# Patient Record
Sex: MALE | Race: WHITE | NOT HISPANIC OR LATINO | URBAN - METROPOLITAN AREA
[De-identification: names, ages, dates, MRNs, and addresses within clinical notes are randomized per-mention and may not be internally consistent; named-entity substitution may affect disease eponyms.]

---
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
## 2022-06-09 ENCOUNTER — EMERGENCY (EMERGENCY)
Facility: HOSPITAL | Age: 22
LOS: 1 days | Discharge: HOME | End: 2022-06-09
Attending: STUDENT IN AN ORGANIZED HEALTH CARE EDUCATION/TRAINING PROGRAM
Payer: COMMERCIAL

## 2022-06-09 VITALS
SYSTOLIC BLOOD PRESSURE: 112 MMHG | HEART RATE: 100 BPM | DIASTOLIC BLOOD PRESSURE: 64 MMHG | WEIGHT: 119.93 LBS | TEMPERATURE: 98 F | RESPIRATION RATE: 18 BRPM | OXYGEN SATURATION: 96 %

## 2022-06-09 PROCEDURE — 99283 EMERGENCY DEPT VISIT LOW MDM: CPT

## 2022-06-09 PROCEDURE — 82962 GLUCOSE BLOOD TEST: CPT

## 2022-06-09 PROCEDURE — 99284 EMERGENCY DEPT VISIT MOD MDM: CPT

## 2022-06-09 RX ORDER — LAMOTRIGINE 25 MG/1
100 TABLET, ORALLY DISINTEGRATING ORAL ONCE
Refills: 0 | Status: COMPLETED | OUTPATIENT
Start: 2022-06-09 | End: 2022-06-09

## 2022-06-09 RX ADMIN — LAMOTRIGINE 100 MILLIGRAM(S): 25 TABLET, ORALLY DISINTEGRATING ORAL at 19:25

## 2022-06-09 NOTE — ED PROVIDER NOTE - PHYSICAL EXAMINATION
General: well appearing male, no acute distress   HEENT: normocephalic, atraumatic, EOMI, c-collar in place   Respiratory: normal work of breathing  Cardiac: regular rate and rhythm   Abdomen: soft, non-tender, no guarding or rebound   MSK: no swelling or tenderness of lower extremities, moving all extremities spontaneously   Skin: warm, dry   Neuro: A&Ox3, cranial nerves II-XII intact, 5/5 strength in all extremities, no sensory deficits  Psych: appropriate affect

## 2022-06-09 NOTE — ED ADULT NURSE NOTE - CHIEF COMPLAINT QUOTE
pt biba as per ems pt had witnessed seizure by the bystanders . pt appeared to be shaking. as per mother pt h/o seizures on Lamictal

## 2022-06-09 NOTE — ED POST DISCHARGE NOTE - DETAILS
mother called as she found a bump on the top of the patient's head and was concerned about concussion. explained to patient's mother that a concussion is a clinical diagnosis. patient's mental status unchanged from when he was discharged. has been sleepy but that is typical for him after a seizure. discussed with mother symptoms to look for (worsening headache, dizziness, vomiting, etc). low concern for clinically significant intracranial hemorrhage as its more than 4 hours after the event.

## 2022-06-09 NOTE — ED ADULT TRIAGE NOTE - CHIEF COMPLAINT QUOTE
pt biba as per ems pt had witnessed seizure by the bystanders . pt appeared to be shaking pt biba as per ems pt had witnessed seizure by the bystanders . pt appeared to be shaking. as per mother pt h/o seizures on Lamictal

## 2022-06-09 NOTE — ED PROVIDER NOTE - OBJECTIVE STATEMENT
had been seizure free for three years and was thinking about stopping, but then had a seizure in march. had an EEg shortly before that seemed normal. med 100mg lamagotrigne bid. today had an additional. autism, intellectual disability. seizure occurred approximately one hour ago at a bank. 21M, pmh of autism, intellectual disability, seizures ( lamotrigine 100mg BID), presenting after a seizure. history obtained from patient and mother over the phone. patient was at a bank when he had a witnessed seizure. paitent denies missing any doses of medication (parents dispense), fever, chest pain, shortness of breath, abdominal pain, diarrhea. mother reports the patient had been seizure free for three years, but ahd a break through seizure in march and then again today. is being followed by a neurologist at [facility]. 21M, pmh of autism, intellectual disability, seizures ( lamotrigine 100mg BID), presenting after a seizure. history obtained from patient and mother over the phone. patient was at a bank when he had a witnessed seizure. patient denies missing any doses of medication (parents dispense), fever, chest pain, shortness of breath, abdominal pain, diarrhea. mother reports the patient had been seizure free for three years, but had a break through seizure in march and then again today. is being followed by a neurologist at [facility].

## 2022-06-09 NOTE — ED PROVIDER NOTE - PATIENT PORTAL LINK FT
You can access the FollowMyHealth Patient Portal offered by [facility] by registering at the following website: [email or web address]. By joining [employer]’s FollowMyHealth portal, you will also be able to view your health information using other applications (apps) compatible with our system.

## 2022-06-09 NOTE — ED PROVIDER NOTE - NSFOLLOWUPINSTRUCTIONS_ED_ALL_ED_FT
You were seen in the emergency department after a seizure.     Please follow-up with your primary care doctor in the next 24-48 hours.     Please follow-up with your neurologist within the next week.     If you have any worsening symptoms, severe headache, dizziness, nausea, vomiting, chest pain or shortness of breath, or you have another seizure, please return to the emergency department.

## 2022-06-09 NOTE — ED PROVIDER NOTE - CLINICAL SUMMARY MEDICAL DECISION MAKING FREE TEXT BOX
21M presenting after a witnessed seizure. c-collar cleared. will give evening dose of seizure mediation and observe. mother now at bedside.